# Patient Record
Sex: MALE | URBAN - METROPOLITAN AREA
[De-identification: names, ages, dates, MRNs, and addresses within clinical notes are randomized per-mention and may not be internally consistent; named-entity substitution may affect disease eponyms.]

---

## 2021-02-18 ENCOUNTER — OFFICE VISIT (OUTPATIENT)
Dept: URBAN - METROPOLITAN AREA CLINIC 27 | Facility: CLINIC | Age: 51
End: 2021-02-18
Payer: OTHER GOVERNMENT

## 2021-02-18 DIAGNOSIS — H35.3211 EXDTVE AGE-REL MCLR DEGN, RIGHT EYE, WITH ACTV CHRDL NEOVAS: ICD-10-CM

## 2021-02-18 DIAGNOSIS — H35.81 RETINAL EDEMA: Primary | ICD-10-CM

## 2021-02-18 DIAGNOSIS — H35.3122 NEXDTVE AGE-RELATED MCLR DEGN, LEFT EYE, INTERMED DRY STAGE: ICD-10-CM

## 2021-02-18 DIAGNOSIS — H25.13 AGE-RELATED NUCLEAR CATARACT, BILATERAL: ICD-10-CM

## 2021-02-18 PROCEDURE — 92134 CPTRZ OPH DX IMG PST SGM RTA: CPT | Performed by: OPHTHALMOLOGY

## 2021-02-18 PROCEDURE — 92235 FLUORESCEIN ANGRPH MLTIFRAME: CPT | Performed by: OPHTHALMOLOGY

## 2021-02-18 PROCEDURE — 99204 OFFICE O/P NEW MOD 45 MIN: CPT | Performed by: OPHTHALMOLOGY

## 2021-02-18 PROCEDURE — 67028 INJECTION EYE DRUG: CPT | Performed by: OPHTHALMOLOGY

## 2021-02-18 ASSESSMENT — INTRAOCULAR PRESSURE
OS: 13
OD: 12

## 2021-02-18 NOTE — IMPRESSION/PLAN
Impression: Retinal edema: H35.81. Plan: patient referred for evaluation of shadow in his vision and blurry vision over the past 2 yrs. Exam and OCT and FA demonstrate RPE mottling and tr leakage/IRF OD. Discussed etiology may be related to past trauma (prior commotio retinae) vs / Atypical AMD OD > OS. Fortunately, FA demonstrates no ONH leakage ruling out active inflammation. Discussed treatment options including injection vs laser vs observation. Recommend Avastin injection OD today. R/B/A discussed and patient elects to proceed. Intravitreal Avastin injected OD today without complication.   

RTC 4-6 wks with OCT OU, poss Avastin OD

## 2021-02-18 NOTE — IMPRESSION/PLAN
Impression: Exdtve age-rel mclr degn, right eye, with actv chrdl neovas: H35.3211.
- Atypical Wet AMD vs  Plan: See plan for retinal edema

## 2021-02-18 NOTE — IMPRESSION/PLAN
Impression: Nexdtve age-related mclr degn, left eye, intermed dry stage: H35.3122.
- dry AMD vs old trauma vs old  OS Plan: Exam and OCT demonstrate RPE changes OS. Recommend AG monitoring.   Observe for now